# Patient Record
Sex: MALE | Race: WHITE
[De-identification: names, ages, dates, MRNs, and addresses within clinical notes are randomized per-mention and may not be internally consistent; named-entity substitution may affect disease eponyms.]

---

## 2018-01-07 ENCOUNTER — HOSPITAL ENCOUNTER (OUTPATIENT)
Dept: HOSPITAL 24 - ER | Age: 43
Setting detail: OBSERVATION
LOS: 1 days | Discharge: HOME | End: 2018-01-08
Attending: INTERNAL MEDICINE | Admitting: INTERNAL MEDICINE
Payer: COMMERCIAL

## 2018-01-07 VITALS — BODY MASS INDEX: 36.5 KG/M2

## 2018-01-07 DIAGNOSIS — I10: ICD-10-CM

## 2018-01-07 DIAGNOSIS — R10.11: ICD-10-CM

## 2018-01-07 DIAGNOSIS — E11.65: ICD-10-CM

## 2018-01-07 DIAGNOSIS — K35.89: Primary | ICD-10-CM

## 2018-01-07 DIAGNOSIS — R10.84: ICD-10-CM

## 2018-01-07 DIAGNOSIS — K21.9: ICD-10-CM

## 2018-01-07 LAB
ALBUMIN SERPL BCP-MCNC: 3.8 G/DL (ref 3.4–5)
ALP SERPL-CCNC: 118 UNITS/L (ref 46–116)
ALT SERPL W P-5'-P-CCNC: 53 UNITS/L (ref 12–78)
AMYLASE SERPL-CCNC: 42 UNITS/L (ref 25–115)
AST SERPL W P-5'-P-CCNC: 28 UNITS/L (ref 15–37)
BACTERIA URNS QL MICRO: NEGATIVE /HPF
BASOPHILS # BLD AUTO: 0.1 X10^3/UL (ref 0–0.1)
BASOPHILS NFR BLD AUTO: 0.5 % (ref 0.2–1)
BILIRUB UR QL STRIP.AUTO: NEGATIVE
BUN SERPL-MCNC: 13 MG/DL (ref 7–18)
CALCIUM ALBUM COR SERPL-SCNC: (no result) MG/DL (ref 8.5–10.1)
CALCIUM ALBUM COR SERPL-SCNC: 141 MMOL/L (ref 136–145)
CALCIUM SERPL-MCNC: 8.8 MG/DL (ref 8.5–10.1)
CHLORIDE SERPL-SCNC: 103 MMOL/L (ref 98–107)
CLARITY UR: CLEAR
CO2 SERPL-SCNC: 27.1 MMOL/L (ref 21–32)
COLOR UR AUTO: YELLOW
CREAT SERPL-MCNC: 1.23 MG/DL (ref 0.7–1.3)
EGFR  BLACK RACES: > 60 (ref 60–?)
EOSINOPHIL # BLD AUTO: 0 X10^3/UL (ref 0–0.2)
EOSINOPHIL NFR BLD AUTO: 0.1 % (ref 0.9–2.9)
ERYTHROCYTE [DISTWIDTH] IN BLOOD BY AUTOMATED COUNT: 13.1 % (ref 11.6–16.5)
GLUCOSE UR QL STRIP.AUTO: NEGATIVE
HCT VFR BLD AUTO: 43.2 % (ref 42–54)
HGB BLD-MCNC: 15.1 G/DL (ref 13.5–18)
KETONES UR QL STRIP.AUTO: NEGATIVE
LEUKOCYTE ESTERASE UR QL STRIP.AUTO: NEGATIVE
LIPASE SERPL-CCNC: 94 UNITS/L (ref 73–393)
LYMPHOCYTES # BLD AUTO: 2 X10^3/UL (ref 1.3–2.9)
LYMPHOCYTES NFR BLD AUTO: 10.7 % (ref 21–51)
MCH RBC QN AUTO: 28.8 PG (ref 27–34)
MCHC RBC AUTO-ENTMCNC: 34.9 G/DL (ref 33–35)
MCV RBC AUTO: 82.7 FL (ref 80–100)
MONOCYTES # BLD AUTO: 1.1 X10^3/UL (ref 0.3–0.8)
MONOCYTES NFR BLD AUTO: 6 % (ref 0–13)
NEUTROPHILS # BLD AUTO: 15.2 X10^3/UL (ref 2.2–4.8)
NEUTROPHILS NFR BLD AUTO: 82.7 % (ref 42–75)
NITRITE UR QL STRIP.AUTO: NEGATIVE
PH UR STRIP.AUTO: 5 [PH] (ref 5–8)
PLATELET # BLD: 303 X10^3/UL (ref 150–450)
PMV BLD AUTO: 7.8 FL (ref 7.4–11)
PROT SERPL-MCNC: 7.9 G/DL (ref 6.4–8.2)
PROT UR QL STRIP.AUTO: (no result)
RBC # BLD AUTO: 5.22 X10^6/UL (ref 4.7–6)
RBC # UR STRIP.AUTO: (no result) /UL
RBC #/AREA URNS HPF: (no result) /HPF
SODIUM SERPL-SCNC: 140 MMOL/L (ref 136–145)
SP GR UR STRIP.AUTO: 1.02 (ref 1–1.03)
SQUAMOUS URNS QL MICRO: (no result) /HPF
UROBILINOGEN UR QL STRIP.AUTO: NORMAL
WBC NRBC COR # BLD AUTO: 18.4 X10^3/UL (ref 3.6–10)

## 2018-01-07 PROCEDURE — 96375 TX/PRO/DX INJ NEW DRUG ADDON: CPT

## 2018-01-07 PROCEDURE — 99284 EMERGENCY DEPT VISIT MOD MDM: CPT

## 2018-01-07 PROCEDURE — 80053 COMPREHEN METABOLIC PANEL: CPT

## 2018-01-07 PROCEDURE — C9113 INJ PANTOPRAZOLE SODIUM, VIA: HCPCS

## 2018-01-07 PROCEDURE — 0DTJ4ZZ RESECTION OF APPENDIX, PERCUTANEOUS ENDOSCOPIC APPROACH: ICD-10-PCS | Performed by: SURGERY

## 2018-01-07 PROCEDURE — 81001 URINALYSIS AUTO W/SCOPE: CPT

## 2018-01-07 PROCEDURE — 93005 ELECTROCARDIOGRAM TRACING: CPT

## 2018-01-07 PROCEDURE — 36415 COLL VENOUS BLD VENIPUNCTURE: CPT

## 2018-01-07 PROCEDURE — 85025 COMPLETE CBC W/AUTO DIFF WBC: CPT

## 2018-01-07 PROCEDURE — 96374 THER/PROPH/DIAG INJ IV PUSH: CPT

## 2018-01-07 PROCEDURE — 82150 ASSAY OF AMYLASE: CPT

## 2018-01-07 PROCEDURE — 74177 CT ABD & PELVIS W/CONTRAST: CPT

## 2018-01-07 PROCEDURE — G0378 HOSPITAL OBSERVATION PER HR: HCPCS

## 2018-01-07 PROCEDURE — 96365 THER/PROPH/DIAG IV INF INIT: CPT

## 2018-01-07 PROCEDURE — 83690 ASSAY OF LIPASE: CPT

## 2018-01-07 PROCEDURE — 71046 X-RAY EXAM CHEST 2 VIEWS: CPT

## 2018-01-07 RX ADMIN — HYDROMORPHONE HYDROCHLORIDE PRN MG: 2 INJECTION, SOLUTION INTRAMUSCULAR; INTRAVENOUS; SUBCUTANEOUS at 04:22

## 2018-01-07 RX ADMIN — SODIUM CHLORIDE SCH MLS/HR: 4.5 INJECTION, SOLUTION INTRAVENOUS at 14:54

## 2018-01-07 RX ADMIN — CEFTRIAXONE SCH MLS/HR: 1 INJECTION, POWDER, FOR SOLUTION INTRAMUSCULAR; INTRAVENOUS at 04:00

## 2018-01-07 RX ADMIN — SODIUM CHLORIDE SCH MLS/HR: 900 INJECTION INTRAVENOUS at 14:49

## 2018-01-07 RX ADMIN — SODIUM CHLORIDE SCH: 900 INJECTION INTRAVENOUS at 05:26

## 2018-01-07 RX ADMIN — ONDANSETRON PRN MG: 2 INJECTION, SOLUTION INTRAMUSCULAR; INTRAVENOUS at 19:36

## 2018-01-07 RX ADMIN — SODIUM CHLORIDE SCH MLS/HR: 900 INJECTION INTRAVENOUS at 22:53

## 2018-01-07 RX ADMIN — ONDANSETRON PRN MG: 2 INJECTION, SOLUTION INTRAMUSCULAR; INTRAVENOUS at 12:31

## 2018-01-07 RX ADMIN — SODIUM CHLORIDE SCH ML: 9 INJECTION, SOLUTION INTRAMUSCULAR; INTRAVENOUS; SUBCUTANEOUS at 22:53

## 2018-01-07 RX ADMIN — HYDROMORPHONE HYDROCHLORIDE PRN MG: 2 INJECTION, SOLUTION INTRAMUSCULAR; INTRAVENOUS; SUBCUTANEOUS at 12:31

## 2018-01-07 RX ADMIN — HYDROMORPHONE HYDROCHLORIDE PRN MG: 1 INJECTION, SOLUTION INTRAMUSCULAR; INTRAVENOUS; SUBCUTANEOUS at 16:34

## 2018-01-07 RX ADMIN — SODIUM CHLORIDE SCH MLS/HR: 4.5 INJECTION, SOLUTION INTRAVENOUS at 22:53

## 2018-01-07 RX ADMIN — HYDROMORPHONE HYDROCHLORIDE PRN MG: 1 INJECTION, SOLUTION INTRAMUSCULAR; INTRAVENOUS; SUBCUTANEOUS at 19:37

## 2018-01-07 RX ADMIN — CEFTRIAXONE SCH MLS/HR: 1 INJECTION, POWDER, FOR SOLUTION INTRAMUSCULAR; INTRAVENOUS at 09:29

## 2018-01-07 RX ADMIN — HYDROMORPHONE HYDROCHLORIDE PRN MG: 1 INJECTION, SOLUTION INTRAMUSCULAR; INTRAVENOUS; SUBCUTANEOUS at 22:54

## 2018-01-07 RX ADMIN — SODIUM CHLORIDE SCH: 9 INJECTION, SOLUTION INTRAMUSCULAR; INTRAVENOUS; SUBCUTANEOUS at 14:54

## 2018-01-07 RX ADMIN — SODIUM CHLORIDE SCH MLS/HR: 4.5 INJECTION, SOLUTION INTRAVENOUS at 04:22

## 2018-01-07 RX ADMIN — PANTOPRAZOLE SODIUM SCH: 40 INJECTION, POWDER, FOR SOLUTION INTRAVENOUS at 09:30

## 2018-01-07 RX ADMIN — SODIUM CHLORIDE SCH: 9 INJECTION, SOLUTION INTRAMUSCULAR; INTRAVENOUS; SUBCUTANEOUS at 05:26

## 2018-01-07 RX ADMIN — SODIUM CHLORIDE SCH MLS/HR: 900 INJECTION INTRAVENOUS at 05:25

## 2018-01-07 NOTE — RAD
Examination: Chest, PA and lateral views



History: Right upper quadrant pain



Findings: Normal appearance of heart, lungs, mediastinum and pleural spaces.



Impression: No abnormality demonstrated.



Reported By:Electronically Signed by ESTEFANY HUNTLEY MD at 1/7/2018 6:56:21 AM

## 2018-01-07 NOTE — CT
CT abdomen and pelvis with contrast 



Indication: Right lower quadrant pain



Technique: Helical CT images of the abdomen and pelvis were obtained with IV contrast. Reformatted im
ages in the coronal and sagittal planes were also generated for review.



Comparison: None



Findings: Lung bases are clear. No aggressive osseous lesions are identified.



The liver, gallbladder, spleen, pancreas, adrenals and kidneys are unremarkable. The appendix is mode
rately dilated with periappendiceal inflammatory stranding, compatible with acute appendicitis. No as
sociated abscess or findings of perforation seen. The remaining GI tract is unremarkable. The IVC, ab
dominal aorta, urinary bladder and prostate are normal. No free air, free fluid or lymphadenopathy is
 identified.



Impression: 

Uncomplicated acute appendicitis.









Reported By:Electronically Signed by ALEXI VO MD at 1/7/2018 3:45:02 AM

## 2018-01-07 NOTE — DR.GENAD
HPI





- PCP


Primary Care Physician: [facility]





- Complaint/Symptoms


Chief Complaint Doctors Comments: Patient is complaining of right CVA and RUQ 

pain for the past five hours getting worst tonight with scant diarrhea.  States 

the pain is worst when he eats and moves.  States he tried to eat some soup 

today and it made the pain worst and he has not been able to eat anything 

today.  States he has no local doctor and has been going to [facility] and does not know the name of the doctor.  He denies hematuria, 

nocturia, sylvester, cold, cough, fever or chills.  He denies alcohol or tobacco 

usage.


Chief Complaint:: RIGHT UPPER QUADRANT PAIN





- Nurses notes reviewed


Nurses Notes Review: Yes





- Source


History Provided: Patient





- Mode of Arrival


Mode of Arrival: Ambulatory





- Timing


Onset of Chief Complaint: 18


Came on: Gradually





- Duration


Duration: Constant


How lon


Duration: Hours





- Location


Location: right CVA, upper and lower abdominal pain





- Severity


Severity: Moderate





- Modifying Factors


Worsens:: eating and movement


Improves:: nothing





PMH





- PMH


Past Medical History: No


Past Medical History: GERD, Hypertension


Past Surgical History: Yes


Past Surgical History Comment: SHOULDER SURGERY(RIGHT)





- Family History


History of Family Medical Conditions: No





- Social History


Does patient currently use any type of tobacco product: No


Have you used tobacco products in the last 12 months: No


Type of Tobacco Use: None


Does any household member use tobacco: No


Alcohol Use: Occasionally


Do you use any recreational Drugs:: No


Lives With: Family


Lives Where: Home





- infectious screening


In the last 2 months have you had wt loss of >10#?: NO


Have you had fever, night sweats or hemotysis?: No


Have you traveled outside the country in the last 6 months?: No


Isolation: Standard





ROS





- Review of Systems


Constitutional: No Symptoms Reported, Weakness, Loss of Appetite.  negative: 

See HPI, Chills, Diaphoresis, Fever, Malaise, Irritable, Fatigue, Other


Eyes: No Symptoms Reported.  negative: See HPI, Eye Pain, Blurred Vision, 

Tearing, Discharge, Photophobia, Diplopia, Other


ENTM: No Symptoms Reported


Respiratoy: No Symptoms Reported.  negative: See HPI, Productive Cough, Non-

Productive Cough, Moist Cough, Dry Cough, Hacking Cough, Barking Cough, Brassy 

Cough, Orthopnea, Short of Breath, Stridor, Wheezing, Hemoptysis, Other


Cardiovascular: No Symptoms Reported.  negative: See HPI, Chest Pain, Edema, 

Palpitations, Syncope, Cyanosis, Skin Mottling, Other


Gastrointestinal/Abdominal: No Symptoms Reported, Abdominal Pain, Diarrhea, 

Nausea, Vomiting.  negative: See HPI, Constipation, Food Intolerance, Other


Genitourinary: No Symptoms Reported.  negative: See HPI, Discharge, Dysuria, 

Frequency, Hematuria, Pain, Bleeding, Other


Neurological: No Symptoms Reported


Musculoskeletal: No Symptoms Reported


Integumentary: No Symptoms Reported.  negative: See HPI, Change in Color, 

Change in Hair/Nails, Dryness, Lesions, Lumps, Rash, Itching, Wound, Bruises, 

Juandice, Other


Hematologic/Lymphatic: No Symptoms Reported.  negative: See HPI, Anemia, Blood 

Clots, Easy Bleeding, Easy Bruising, Swollen Glands, Lymphadenopathy, Other


Endocrine: No Symptoms Reported, Decreased Appetite


Psychiatric: No Symptoms Reported





PE





- Vital Signs


Vitals: 


 





Temperature                      99.2 F


Pulse Rate                       73


Respiratory Rate                 18


Blood Pressure                   173/97


O2 Sat by Pulse Oximetry         97











- General


Limitations: No Limitations


General Appearance: Alert, In Distress (moderate)





- Head


Head Exam: Normal Inspection, Atraumatic, Normocephalic





- Eyes


Eye exam: Normal Appearance, PERRL, EOMI.  negative: Scleral Icterus, 

Conjunctival Injection, Nystagmus, Miosis, Mydrasis, Periorbital Swelling, 

Periorbital Tenderness, Other





- ENT


ENT Exam: Normal Exam, Normal Oropharynx, Normal  External Ear Exam, Mucous 

Membranes Moist, TM's Normal Bilaterally


External Ear Exam: Normal External Inspection


TM/Canal Exam: Bilateral Normal


Nose Exam: Normal Nose Exam


Mouth Exam: Normal Inspection


Throat Exam: Normal Inspection.  negative: Tonsillar Erythema, Tonsillomegaly, 

Tonsillar Exudate, R Peritonsillar Mass, L Peritonsillar Mass, Muffled Voice, 

Other





- Neck


Neck Exam: Normal Inspection, Full ROM, Trachea Midline.  negative: Tenderness, 

Meningismus, Lymphadenopathy, Thyromegaly, Other





- Chest


Chest Inspection: Normal Inspection, Symmetric Chest Wall Rise





- Respiratory


Respiratory Exam: Normal Lung Sounds Bilat


Respiratory Exam: Bilateral Clear to Auscultation





- Cardiovascular


Cardiovascular Exam: Regular Rate, Normal Rhythm, Normal Heart Sounds





- Abdominal Exam


Abdominal Exam: Normal Inspection, Normal Bowel Sounds, Soft, Distention, 

Tenderness (RLQ, RUQ tenderness with guarding; suprapubic tenderness), Guarding

, Dimnished Bowel Sounds


Abdominal Tenderness: RUQ, RLQ, Suprapubic, Moderate





- Extremities


Extremities Exam: Normal Inspection, Full ROM, Normal Capillary Refill.  

negative: Tenderness, Edema, Joint Swelling, Calf Tenderness, Other





- Back


Back Exam: Normal Inspection, Full ROM, (R) CVA Tenderness.  negative: 

Tenderness, (L) CVA Tenderness, Muscle Spasm, Paraspinal Tenderness, Vertebral 

Tenderness, Rashes, (R) Sciatic Notch Tenderness, (L) Sciatic Notch Tendern, (R

) Straight Leg Raise, (L) Straight Leg Raise, Other





- Neurologic


Neurological Exam: Alert, Oriented X3, CN II-XII Intact, Reflexes Normal.  

negative: Normal Gait (gait not tested)





- Psychiatric


Psychiatric Exam: Normal Affect, Normal Mood





- Skin


Skin Exam: Warm, Dry, Intact, Normal Color





Course





- Reevaluation


1st: Improved





- Consultation


Called: 04:00


Call Returned: 04:00 (Dr. Flaherty want hospitalist to admit and he will do 

surgery in am. Keep NPO, Zosyn antibiotics)


Consultation Comments: 0400 Dr. Berman contacted and agrees to admit patient.





- Education/Counseling


Education/Counseling: Patient, Family


Educated On: Treatment, Diagnosis, Needs for Follow Up





ROR





- Labs Reviewed


Laboratory Results Reviewed?: Yes (all labs and x-ray results reviewed and 

discussed with patient)


Result Diagrams: 


 18 01:25





 18 01:25


Laboratory: 


 











WBC  18.4 X10^3/uL (3.6-10.0)  H  18  01:25    


 


RBC  5.22 X10^6/uL (4.7-6.0)   18  01:25    


 


Hgb  15.1 g/dL (13.5-18.0)   18  01:25    


 


Hct  43.2 % (42.0-54.0)   18  01:25    


 


MCV  82.7 fL (80.0-100.0)   18  01:25    


 


MCH  28.8 pg (27.0-34.0)   18  01:25    


 


MCHC  34.9 g/dL (33.0-35.0)   18  01:25    


 


RDW  13.1 % (11.6-16.5)   18  01:25    


 


Plt Count  303 X10^3/uL (150.0-450.0)   18  01:25    


 


MPV  7.8 fL (7.4-11.0)   18  01:25    


 


Neut %  82.7 % (42.0-75.0)  H  18  01:25    


 


Lymph %  10.7 % (21.0-51.0)  L  18  01:25    


 


Mono %  6.0 % (0.0-13.0)   18  01:25    


 


Eos %  0.1 % (0.9-2.9)  L  18  01:25    


 


Baso %  0.5 % (0.2-1.0)   18  01:25    


 


Neut #  15.2 x10^3/uL (2.2-4.8)  H  18  01:25    


 


Lymph #  2.0 X10^3/uL (1.3-2.9)   18  01:25    


 


Mono #  1.1 x10^3/uL (0.3-0.8)  H  18  01:25    


 


Eos #  0.0 x10^3/uL (0.0-0.2)   18  01:25    


 


Baso #  0.1 X10^3/uL (0.0-0.1)   18  01:25    


 


Absolute Nucleated RBC  0.0 /100WBC  18  01:25    


 


Sodium  140 mmol/L (136-145)   18  01:25    


 


Corrected Sodium  141 mmol/L (136-145)   18  01:25    


 


Potassium  3.7 mmol/L (3.5-5.1)   18  01:25    


 


Chloride  103 mmol/L ()   18  01:25    


 


Carbon Dioxide  27.1 mmol/L (21-32)   18  01:25    


 


BUN  13 mg/dL (7-18)   18  01:25    


 


Creatinine  1.23 mg/dL (0.70-1.30)   18  01:25    


 


Est GFR (MDRD) Af Amer  > 60  (>60)   18  01:25    


 


Est GFR (MDRD) Non-Af  > 60  (>60)   18  01:25    


 


Glucose  128 mg/dL (65-99)  H  18  01:25    


 


Calcium  8.8 mg/dL (8.5-10.1)   18  01:25    


 


Corrected Calcium  TNP   18  01:25    


 


Total Bilirubin  0.40 mg/dL (0.2-1.0)   18  01:25    


 


AST  28 Units/L (15-37)   18  01:25    


 


ALT  53 Units/L (12-78)   18  01:25    


 


Alkaline Phosphatase  118 Units/L ()  H  18  01:25    


 


Total Protein  7.9 g/dL (6.4-8.2)   18  01:25    


 


Albumin  3.8 g/dL (3.4-5.0)   18  01:25    


 


Globulin  4.1 g/dL (2.5-4.5)   18  01:25    


 


Albumin/Globulin Ratio  0.9 Ratio (1.1-2.1)  L  18  01:25    


 


Amylase  42 Units/L ()   18  01:25    


 


Lipase  94 Units/L ()   18  01:25    


 


Specimen Type  Clean catch urine   18  01:30    


 


Urine Color  Yellow  (YELLOW)   18  01:30    


 


Urine Appearance  Clear  (CLEAR)   18  01:30    


 


Urine pH  5.0  (5.0 - 8.0)   18  01:30    


 


Ur Specific Gravity  1.020  (1.000-1.030)   18  01:30    


 


Urine Protein  2+  (NEGATIVE)   18  01:30    


 


Urine Glucose (UA)  Negative  (NEGATIVE)   18  01:30    


 


Urine Ketones  Negative  (NEGATIVE)   18  01:30    


 


Urine Occult Blood  1+  (NEGATIVE)   18  01:30    


 


Urine Nitrite  Negative  (NEGATIVE)   18  01:30    


 


Urine Bilirubin  Negative  (NEGATIVE)   18  01:30    


 


Urine Urobilinogen  Normal  (NORMAL)   18  01:30    


 


Ur Leukocyte Esterase  Negative  (NEGATIVE)   18  01:30    


 


Urine RBC  Rare /HPF (NEGATIVE)   18  01:30    


 


Urine WBC  None seen /HPF (NEGATIVE)   18  01:30    


 


Ur Squamous Epith Cells  Rare /HPF (NEGATIVE)   18  01:30    


 


Urine Bacteria  Negative /HPF (NEGATIVE)   18  01:30    


 


Ur Culture Indicated?  No/not indicated   18  01:30    














- XRAY


XRAY Interpreted by: Radiologist (CT abdomen: Uncomplicated acute appendicitis)





- Diagnosis


Discharge Problem: 


 Hyperglycemia, Abdominal pain in male





Acute appendicitis


Qualifiers:


 Acute appendicitis type: unspecified acute appendicitis type Qualified Code(s)

: K35.80 - Unspecified acute appendicitis








- Discharge Plan


Disposition: 09 ADMITTED AS INPATIENT


Condition: Stable





- Follow ups/Referrals


Follow ups/Referrals: 


[facility],None [Primary Care Provider] - 3 days





- Instructions

## 2018-01-07 NOTE — DR.H&P
H&P





- History & Physical for Day of:


H&P Date: 01/07/18





- Chief Complaint


Chief Complaint: RUQ ABDOMINAL PAIN





- Allergies


Allergies/Adverse Reactions: 


 Allergies











Allergy/AdvReac Type Severity Reaction Status Date / Time


 


codeine Allergy   Verified 01/07/18 02:08














- History of Present Illness


History of Present Illness:  is a 42 year old white male who presented 

to the emergency room with complaints of right upper quadrant pain. Patient 

reports that pain has been persistent for the past five hours and is worse when 

he tries to eat and move. Associated symptoms include weakness, loss of appetite

, and diarrhea. Patient denies hematuria, nocturia, melena, and c/c/c. He has a 

medical history of hypertension, sleep apnea, and GERD. On examination, patient 

is noted to be in moderate distress, secondary to pain in the right upper 

quadrant. Right upper quadrant is noted with tenderness with guarding. On 

arrival to the emergency room, vitals were 99.2, 73, 18, 97%RA, 173/97. Labs 

were obtained. Abnormal Labs include the following: WBC 18.4, Glucose 128, Alk 

phos 118, A/G Ratio 0.9. Urinalysis: Protein 2+, Occult Blood 1+, RBC rare, 

Squam Epith Cells Rare. EKG: Sinus rhythm. Heart rate=58. Chest xray: No 

abnormality demonstrated. Abd/Pelvis CT obtained and shows acute appendicitis. 

Patient will be admitted to the hospital for further evaluation and treatment. 

A general surgery consult will be performed. He was started on rocephin 1gm iv 

daily, zosyn 3.375gm iv tid,  ns at 125ml/hr, and iv pain and nausea 

medications. Patient is clear for appendectomy should patient and  

decide that is an option. If surgery is uncomplicated and pain is controlled, 

patient may be discharged home when  approves.





- Past Medical History


Past Medical History: GERD, Hypertension





- Past Surgical History


Surgical History: Ortho Surgery





- Social History


Does patient currently use any type of tobacco product: No


Have you used tobacco products in the last 12 months: No


Type of Tobacco Use: None


Does any household member use tobacco: No


Alcohol Use: Occasionally


Drug Use: None





- Medications


Home Medications: 


 





Amlodipine Besylate [NORVASC 5 MG *] PO DAILY 01/07/18 [History]











- Review of Systems


Constitutional: Weakness, Other (LOSS OF APPETITE)


Eyes: No Symptoms Reported


ENT: No Symptoms Reported


Respiratory: No Symptoms Reported


Cardiovascular: No Symptoms Reported


Gastrointestinal: See HPI, Nausea, Abdominal Pain, Diarrhea.  denies: 

Constipation, Melena, Hematochezia


Genitourinary: No Symptoms Reported


Musculoskeletal: No Symptoms Reported


Skin: No Symptoms Reported


Neurological: Weakness





- Physical Exam


Vital Signs: 


 





Temperature                      98.1 F


Pulse Rate [Apical]              86


Pulse Rate                       73


Respiratory Rate                 20


Blood Pressure [Right Arm]       133/82


Blood Pressure                   173/97


O2 Sat by Pulse Oximetry         95








Oriented: Normal


Eyes: Normal


Ear: Normal


Nose: Normal


Throat: Normal


Respiratory: Clear Throughout


Cardiovascular: Normal


: Normal


Auscultation: Bowel Sounds: Normal


Palpation: Normal


Tenderness: RUQ, RLQ, Moderate, Rebound, Guarding


Skin: Normal


Musculoskeletal: Normal


Psychiatric: Normal


Mood Description: Calm


Affect: Normal


Speech Pattern: Clear





- Assessment/Plan


(1) Acute appendicitis


Qualifiers: 


   Acute appendicitis type: unspecified acute appendicitis type   Qualified Code

(s): K35.80 - Unspecified acute appendicitis   


Status: Acute   


Plan: ADMIT, SURGICAL CONSULT, IV ANTIBIOTICS, IV PAIN AND NAUSEA MEDICATIONS, 

CONTINUE TO MONITOR LABS

## 2018-01-08 VITALS — SYSTOLIC BLOOD PRESSURE: 123 MMHG | DIASTOLIC BLOOD PRESSURE: 70 MMHG

## 2018-01-08 LAB
BASOPHILS # BLD AUTO: 0 X10^3/UL (ref 0–0.1)
BASOPHILS NFR BLD AUTO: 0.4 % (ref 0.2–1)
EOSINOPHIL # BLD AUTO: 0.1 X10^3/UL (ref 0–0.2)
EOSINOPHIL NFR BLD AUTO: 0.6 % (ref 0.9–2.9)
ERYTHROCYTE [DISTWIDTH] IN BLOOD BY AUTOMATED COUNT: 13 % (ref 11.6–16.5)
HCT VFR BLD AUTO: 36.2 % (ref 42–54)
HGB BLD-MCNC: 12.6 G/DL (ref 13.5–18)
LYMPHOCYTES # BLD AUTO: 1.9 X10^3/UL (ref 1.3–2.9)
LYMPHOCYTES NFR BLD AUTO: 18.1 % (ref 21–51)
MCH RBC QN AUTO: 28.7 PG (ref 27–34)
MCHC RBC AUTO-ENTMCNC: 34.7 G/DL (ref 33–35)
MCV RBC AUTO: 82.8 FL (ref 80–100)
MONOCYTES # BLD AUTO: 0.8 X10^3/UL (ref 0.3–0.8)
MONOCYTES NFR BLD AUTO: 7.7 % (ref 0–13)
NEUTROPHILS # BLD AUTO: 7.5 X10^3/UL (ref 2.2–4.8)
NEUTROPHILS NFR BLD AUTO: 73.2 % (ref 42–75)
PLATELET # BLD: 237 X10^3/UL (ref 150–450)
PMV BLD AUTO: 8.2 FL (ref 7.4–11)
RBC # BLD AUTO: 4.37 X10^6/UL (ref 4.7–6)
WBC NRBC COR # BLD AUTO: 10.3 X10^3/UL (ref 3.6–10)

## 2018-01-08 RX ADMIN — SODIUM CHLORIDE SCH ML: 9 INJECTION, SOLUTION INTRAMUSCULAR; INTRAVENOUS; SUBCUTANEOUS at 05:44

## 2018-01-08 RX ADMIN — PANTOPRAZOLE SODIUM SCH MG: 40 INJECTION, POWDER, FOR SOLUTION INTRAVENOUS at 08:51

## 2018-01-08 RX ADMIN — HYDROMORPHONE HYDROCHLORIDE PRN MG: 1 INJECTION, SOLUTION INTRAMUSCULAR; INTRAVENOUS; SUBCUTANEOUS at 03:02

## 2018-01-08 RX ADMIN — CEFTRIAXONE SCH MLS/HR: 1 INJECTION, POWDER, FOR SOLUTION INTRAMUSCULAR; INTRAVENOUS at 11:04

## 2018-01-08 RX ADMIN — ONDANSETRON PRN MG: 2 INJECTION, SOLUTION INTRAMUSCULAR; INTRAVENOUS at 03:02

## 2018-01-08 RX ADMIN — SODIUM CHLORIDE SCH MLS/HR: 900 INJECTION INTRAVENOUS at 05:44

## 2018-01-08 RX ADMIN — HYDROMORPHONE HYDROCHLORIDE PRN MG: 1 INJECTION, SOLUTION INTRAMUSCULAR; INTRAVENOUS; SUBCUTANEOUS at 08:51

## 2018-01-08 RX ADMIN — SODIUM CHLORIDE SCH MLS/HR: 4.5 INJECTION, SOLUTION INTRAVENOUS at 05:44

## 2018-01-08 RX ADMIN — ONDANSETRON PRN MG: 2 INJECTION, SOLUTION INTRAMUSCULAR; INTRAVENOUS at 08:51

## 2018-02-26 ENCOUNTER — HOSPITAL ENCOUNTER (OUTPATIENT)
Dept: HOSPITAL 24 - SURG1 | Age: 43
Discharge: HOME | End: 2018-02-26
Attending: SURGERY
Payer: COMMERCIAL

## 2018-02-26 VITALS — DIASTOLIC BLOOD PRESSURE: 86 MMHG | SYSTOLIC BLOOD PRESSURE: 129 MMHG

## 2018-02-26 DIAGNOSIS — Z80.0: ICD-10-CM

## 2018-02-26 DIAGNOSIS — Z86.010: ICD-10-CM

## 2018-02-26 DIAGNOSIS — D12.5: ICD-10-CM

## 2018-02-26 DIAGNOSIS — R19.4: Primary | ICD-10-CM

## 2018-02-26 PROCEDURE — 0DBL8ZX EXCISION OF TRANSVERSE COLON, VIA NATURAL OR ARTIFICIAL OPENING ENDOSCOPIC, DIAGNOSTIC: ICD-10-PCS | Performed by: SURGERY

## 2018-02-26 PROCEDURE — 0DJD8ZZ INSPECTION OF LOWER INTESTINAL TRACT, VIA NATURAL OR ARTIFICIAL OPENING ENDOSCOPIC: ICD-10-PCS | Performed by: SURGERY

## 2018-02-26 PROCEDURE — A4217 STERILE WATER/SALINE, 500 ML: HCPCS

## 2018-02-26 PROCEDURE — 0DBP8ZX EXCISION OF RECTUM, VIA NATURAL OR ARTIFICIAL OPENING ENDOSCOPIC, DIAGNOSTIC: ICD-10-PCS | Performed by: SURGERY

## 2018-02-26 PROCEDURE — 0DBN8ZX EXCISION OF SIGMOID COLON, VIA NATURAL OR ARTIFICIAL OPENING ENDOSCOPIC, DIAGNOSTIC: ICD-10-PCS | Performed by: SURGERY
